# Patient Record
Sex: MALE | Race: WHITE | NOT HISPANIC OR LATINO | URBAN - METROPOLITAN AREA
[De-identification: names, ages, dates, MRNs, and addresses within clinical notes are randomized per-mention and may not be internally consistent; named-entity substitution may affect disease eponyms.]

---

## 2017-07-31 ENCOUNTER — EMERGENCY (EMERGENCY)
Facility: HOSPITAL | Age: 22
LOS: 1 days | Discharge: HOME | End: 2017-07-31
Attending: EMERGENCY MEDICINE | Admitting: EMERGENCY MEDICINE
Payer: COMMERCIAL

## 2017-07-31 VITALS
SYSTOLIC BLOOD PRESSURE: 133 MMHG | OXYGEN SATURATION: 100 % | RESPIRATION RATE: 17 BRPM | DIASTOLIC BLOOD PRESSURE: 75 MMHG | HEART RATE: 61 BPM | TEMPERATURE: 98 F

## 2017-07-31 VITALS
DIASTOLIC BLOOD PRESSURE: 80 MMHG | SYSTOLIC BLOOD PRESSURE: 146 MMHG | HEART RATE: 55 BPM | TEMPERATURE: 99 F | WEIGHT: 131.4 LBS | RESPIRATION RATE: 19 BRPM | OXYGEN SATURATION: 99 %

## 2017-07-31 PROCEDURE — 99284 EMERGENCY DEPT VISIT MOD MDM: CPT | Mod: 25

## 2017-07-31 PROCEDURE — 96372 THER/PROPH/DIAG INJ SC/IM: CPT

## 2017-07-31 PROCEDURE — 99284 EMERGENCY DEPT VISIT MOD MDM: CPT

## 2017-07-31 PROCEDURE — 76705 ECHO EXAM OF ABDOMEN: CPT

## 2017-07-31 PROCEDURE — 76705 ECHO EXAM OF ABDOMEN: CPT | Mod: 26

## 2017-07-31 RX ORDER — IBUPROFEN 200 MG
600 TABLET ORAL ONCE
Qty: 0 | Refills: 0 | Status: COMPLETED | OUTPATIENT
Start: 2017-07-31 | End: 2017-07-31

## 2017-07-31 RX ORDER — ONDANSETRON 8 MG/1
4 TABLET, FILM COATED ORAL ONCE
Qty: 0 | Refills: 0 | Status: DISCONTINUED | OUTPATIENT
Start: 2017-07-31 | End: 2017-07-31

## 2017-07-31 RX ORDER — CEFTRIAXONE 500 MG/1
250 INJECTION, POWDER, FOR SOLUTION INTRAMUSCULAR; INTRAVENOUS ONCE
Qty: 0 | Refills: 0 | Status: COMPLETED | OUTPATIENT
Start: 2017-07-31 | End: 2017-07-31

## 2017-07-31 RX ORDER — AZITHROMYCIN 500 MG/1
1000 TABLET, FILM COATED ORAL ONCE
Qty: 0 | Refills: 0 | Status: COMPLETED | OUTPATIENT
Start: 2017-07-31 | End: 2017-07-31

## 2017-07-31 RX ORDER — ACETAMINOPHEN 500 MG
1000 TABLET ORAL ONCE
Qty: 0 | Refills: 0 | Status: DISCONTINUED | OUTPATIENT
Start: 2017-07-31 | End: 2017-07-31

## 2017-07-31 RX ADMIN — AZITHROMYCIN 1000 MILLIGRAM(S): 500 TABLET, FILM COATED ORAL at 10:18

## 2017-07-31 RX ADMIN — CEFTRIAXONE 250 MILLIGRAM(S): 500 INJECTION, POWDER, FOR SOLUTION INTRAMUSCULAR; INTRAVENOUS at 10:18

## 2017-07-31 RX ADMIN — Medication 600 MILLIGRAM(S): at 10:18

## 2017-07-31 NOTE — ED PROVIDER NOTE - OBJECTIVE STATEMENT
Rolanda Nickerson, DO: 22M with hx of anxiety and testicular torsion s/p b/l orchiopexy 2014 here for L testicular pain 5/10 with associated erythema & swelling that started 1 hour pta. No fevers/chills, +nausea w/o vomiting. Denies abdominal pain/diarrhea. Pt sexually active with monogamous partner without dysuria, using protection.     Uro: Ermelinda. Rolanda Krishan, DO: 22M with hx of anxiety and testicular torsion s/p b/l orchiopexy 2014 here for L testicular pain 5/10 with associated erythema & swelling that started 1 hour pta. No fevers/chills, +nausea w/o vomiting. Denies abdominal pain/diarrhea. Pt sexually active with monogamous partner without dysuria, using protection.     Urologist: Vannesa    Uro: Ermelinda.

## 2017-07-31 NOTE — ED PROVIDER NOTE - NS ED ROS FT
ROS: denies HA, weakness, dizziness, chest pain, SOB, diaphoresis, abdominal pain, diarrhea, joint pain, neuro deficits, dysuria/hematuria, rash.

## 2017-07-31 NOTE — ED ADULT NURSE NOTE - OBJECTIVE STATEMENT
21 yo male with PMH testicular torsion s/p bilateral orchiplexy presents to ED with one hr left testicular swelling, pain, erythema. No fevers/chills, +nausea w/o vomiting. Denies abdominal pain/diarrhea. Pt sexually active with monogamous partner without dysuria, using protection.

## 2017-07-31 NOTE — CONSULT NOTE ADULT - ASSESSMENT
21 yo with intermittent left scrotal pain  especially after ejacualtion    Likely  brisk cremasteric activity.  Reassured patient  Epididymitis also possible    Will d/c on nsaid, moist heat +/- atb      f/u in office 1-2 weeks

## 2017-07-31 NOTE — ED PROVIDER NOTE - PHYSICAL EXAMINATION
Rolanda Nickerson, DO:  Gen: Well appearing, NAD  Head: NCAT  HEENT: PERRL, MMM, normal conjunctiva, anicteric, neck supple  Lung: CTAB, no adventitious sounds  CV: RRR, no murmurs, rubs or gallops  Abd: soft, NTND, no rebound or guarding, no CVAT  : L testicle horizontal lying, no significant erythema or edema, no cremasteric reflex b/l, epididymal TTP not relieved with lifting, no inguinal hernias  MSK: No edema, no visible deformities  Neuro: No focal neurologic deficits. CN II-XII grossly intact. 5/5 strength and normal sensation in all extremities.  Skin: Warm and dry, no evidence of rash  Psych: normal mood and affect Rolanda Nickerson, DO:  Gen: Well appearing, NAD  Head: NCAT  HEENT: PERRL, MMM, normal conjunctiva, anicteric, neck supple  Lung: CTAB, no adventitious sounds  CV: RRR, no murmurs, rubs or gallops  Abd: soft, NTND, no rebound or guarding, no CVAT  : L testicle horizontal lying, no significant erythema or edema, hyperreflexive cremasteric reflex b/l, epididymal TTP not relieved with lifting, no inguinal hernias  MSK: No edema, no visible deformities  Neuro: No focal neurologic deficits. CN II-XII grossly intact. 5/5 strength and normal sensation in all extremities.  Skin: Warm and dry, no evidence of rash  Psych: normal mood and affect

## 2017-07-31 NOTE — ED PROVIDER NOTE - MEDICAL DECISION MAKING DETAILS
****ATTENDING**** 23yo male hx testicular torsion s/p orchiopexy 2014 pw L testicular pain x 1 hour. States patient has intermittent every week that is at times self limiting and at times he comes in to the ER for evaluation. States he is very upset with these intermittent symptoms and has seen urologist wo any intervention. + sexually active, no recent trauma. + masturbation last night. On exam, Patient is awake,alert,oriented x 3. Patient is well appearing and in no acute distress. Patient's chest is clear to ausculation, +s1s2. Abdomen is soft nd/nt +BS. : no lesions, Mild LAD R groin. No cremasteric reflex elicited B/L. + L epididymal tenderness. No swelling or crepitus. No hernia palpated. Extremity with no swelling or calf tenderness.   Check UA, Sono. Pain control and re eval. ****ATTENDING**** 21yo male hx testicular torsion s/p orchiopexy 2014 pw L testicular pain x 1 hour. States patient has intermittent every week that is at times self limiting and at times he comes in to the ER for evaluation. States he is very upset with these intermittent symptoms and has seen urologist wo any intervention. + sexually active, no recent trauma. + masturbation last night. On exam, Patient is awake,alert,oriented x 3. Patient is well appearing and in no acute distress. Patient's chest is clear to ausculation, +s1s2. Abdomen is soft nd/nt +BS. : no lesions, Mild LAD R groin. hyperreflexive cremasteric reflex elicited B/L. + L epididymal tenderness. No swelling or crepitus. No hernia palpated. Extremity with no swelling or calf tenderness.   Check UA, Sono. Pain control and re eval.

## 2017-07-31 NOTE — ED PROVIDER NOTE - PROGRESS NOTE DETAILS
Rolanda Nickerson, DO: s/p evaluation by uro & Rolanda Nickerson, DO: s/p evaluation by urologist & US team. No evidence of torsion on ultrasound study. Clinical evidence suggestive of epididymitis on Ultrasound & physical exam. D/w patient at bedside with Dr. Cabello present. Pt frustrated because he does not think that this is epididymitis. Does not want to give want to give urine today, but is amenable to being empirically treated. Pt to follow up with urologist as discussed. Clinically stable for discharge. Rolanda Nickerson, DO: s/p evaluation by urologist & US team. No evidence of torsion on ultrasound study. Clinical evidence suggestive of epididymitis on Ultrasound & physical exam. D/w patient at bedside with Dr. Cabello present. Pt frustrated because he does not think that this is epididymitis. Does not want to give want to give urine today, but is amenable to being empirically treated. Pt to follow up with urologist as discussed. Clinically stable for discharge.  Agree with above, had a lengthy discussion with patient and asked what we can do to make him feel better, states his urologist said there is no torsion and now he wants to leave. Pt to follow up and return for any concerns. RG

## 2017-07-31 NOTE — CONSULT NOTE ADULT - SUBJECTIVE AND OBJECTIVE BOX
Patient is a 22y old  Male who presents with a chief complaint of left testicular pain.  Occurs after sexual activity.  Feels it pull up and pain occurs.  He is s/p bilatreral orchiiopexy few years ago for intermittent torsion.  Nonabsorbable suture was used and tunica left open.  Denies voding issues.  Feels that it occurs frequently.  ?Swelling at the time.    HPI:      PAST MEDICAL & SURGICAL HISTORY:  Insomnia  Panic attacks  Anxiety  No significant past surgical history      REVIEW OF SYSTEMS:    CONSTITUTIONAL: No weakness, fevers or chills  HEENT: No visual changes;  No vertigo or throat pain   ENDO: No sweating, no palpitations  NECK: No pain or stiffness  MUSCULOSKELETAL: No back pain, no joint pain  RESPIRATORY: No cough, wheezing, hemoptysis; No shortness of breath  CARDIOVASCULAR: No chest pain or palpitations  GASTROINTESTINAL: No abdominal or epigastric pain. No nausea, vomiting, or hematemesis; No diarrhea or constipation. No melena or hematochezia.  NEUROLOGICAL: No numbness or weakness  PSYCH: No depression, no mood changes  SKIN: No itching, burning, rashes, or lesions   All other review of systems is negative unless indicated above.    MEDICATIONS  (STANDING):    MEDICATIONS  (PRN):      Allergies    Levaquin (Rash)  penicillin (Rash)    Intolerances        SOCIAL HISTORY: No illicit drug use    FAMILY HISTORY:      Vital Signs Last 24 Hrs  T(C): 36.4 (31 Jul 2017 10:23), Max: 37.2 (31 Jul 2017 08:45)  T(F): 97.5 (31 Jul 2017 10:23), Max: 99 (31 Jul 2017 08:45)  HR: 61 (31 Jul 2017 10:23) (55 - 61)  BP: 133/75 (31 Jul 2017 10:23) (133/75 - 146/80)  BP(mean): --  RR: 17 (31 Jul 2017 10:23) (17 - 19)  SpO2: 100% (31 Jul 2017 10:23) (99% - 100%)    PHYSICAL EXAM:    Constitutional: NAD, well-developed  HEENT: SHELIA, EOMI, Normal Hearing, MMM  Neck: No JVD  Back: No CVA tenderness  Respiratory: No accessory respiratory muscle use  Abd: Soft, NT/ND  : nl male bialteral-sl. tenderness left epididymis without swelling.  Brisk bilateral cremsteric reflex    Extremities: No calf tenderness  Neurological: A/O x 3, no focal deficits  Psychiatric: Normal mood, normal affect  Skin: No rashes    I&O's Summary      LABS:              Urine Culture:     RADIOLOGY & ADDITIONAL STUDIES:doppler scrotal u/s-no evidence of torsion or tumor

## 2017-07-31 NOTE — ED PROVIDER NOTE - ATTENDING CONTRIBUTION TO CARE
****ATTENDING**** 21yo male hx testicular torsion s/p orchiopexy 2014 pw L testicular pain x 1 hour. States patient has intermittent every week that is at times self limiting and at times he comes in to the ER for evaluation. States he is very upset with these intermittent symptoms and has seen urologist wo any intervention. + sexually active, no recent trauma. + masturbation last night. On exam, Patient is awake,alert,oriented x 3. Patient is well appearing and in no acute distress. Patient's chest is clear to ausculation, +s1s2. Abdomen is soft nd/nt +BS. : no lesions, Mild LAD R groin. No cremasteric reflex elicited B/L. + L epididymal tenderness. No swelling or crepitus. No hernia palpated. Extremity with no swelling or calf tenderness.   Check UA, Sono. Pain control and re eval.

## 2017-10-20 ENCOUNTER — APPOINTMENT (OUTPATIENT)
Dept: UROLOGY | Facility: CLINIC | Age: 22
End: 2017-10-20
Payer: COMMERCIAL

## 2017-10-20 VITALS
RESPIRATION RATE: 16 BRPM | WEIGHT: 135 LBS | HEIGHT: 72 IN | HEART RATE: 66 BPM | SYSTOLIC BLOOD PRESSURE: 129 MMHG | TEMPERATURE: 97.7 F | BODY MASS INDEX: 18.28 KG/M2 | DIASTOLIC BLOOD PRESSURE: 72 MMHG

## 2017-10-20 DIAGNOSIS — Z80.3 FAMILY HISTORY OF MALIGNANT NEOPLASM OF BREAST: ICD-10-CM

## 2017-10-20 DIAGNOSIS — F41.9 ANXIETY DISORDER, UNSPECIFIED: ICD-10-CM

## 2017-10-20 DIAGNOSIS — Z80.0 FAMILY HISTORY OF MALIGNANT NEOPLASM OF DIGESTIVE ORGANS: ICD-10-CM

## 2017-10-20 DIAGNOSIS — Z86.59 PERSONAL HISTORY OF OTHER MENTAL AND BEHAVIORAL DISORDERS: ICD-10-CM

## 2017-10-20 PROCEDURE — 99203 OFFICE O/P NEW LOW 30 MIN: CPT

## 2017-10-20 RX ORDER — CLONAZEPAM 2 MG/1
TABLET ORAL
Refills: 0 | Status: ACTIVE | COMMUNITY

## 2017-11-17 ENCOUNTER — APPOINTMENT (OUTPATIENT)
Dept: UROLOGY | Facility: CLINIC | Age: 22
End: 2017-11-17
Payer: COMMERCIAL

## 2017-11-17 PROCEDURE — 99215 OFFICE O/P EST HI 40 MIN: CPT

## 2017-11-19 ENCOUNTER — MOBILE ON CALL (OUTPATIENT)
Age: 22
End: 2017-11-19

## 2017-11-22 ENCOUNTER — APPOINTMENT (OUTPATIENT)
Dept: UROLOGY | Facility: CLINIC | Age: 22
End: 2017-11-22
Payer: COMMERCIAL

## 2017-11-22 VITALS
TEMPERATURE: 97.9 F | SYSTOLIC BLOOD PRESSURE: 99 MMHG | HEART RATE: 64 BPM | DIASTOLIC BLOOD PRESSURE: 62 MMHG | OXYGEN SATURATION: 99 %

## 2017-11-22 PROCEDURE — 99214 OFFICE O/P EST MOD 30 MIN: CPT

## 2017-11-30 ENCOUNTER — MESSAGE (OUTPATIENT)
Age: 22
End: 2017-11-30

## 2018-03-01 ENCOUNTER — TRANSCRIPTION ENCOUNTER (OUTPATIENT)
Age: 23
End: 2018-03-01

## 2018-03-01 ENCOUNTER — APPOINTMENT (OUTPATIENT)
Dept: UROLOGY | Facility: CLINIC | Age: 23
End: 2018-03-01
Payer: COMMERCIAL

## 2018-03-01 DIAGNOSIS — N50.819 TESTICULAR PAIN, UNSPECIFIED: ICD-10-CM

## 2018-03-01 PROCEDURE — 99214 OFFICE O/P EST MOD 30 MIN: CPT

## 2018-03-01 RX ORDER — AMITRIPTYLINE HYDROCHLORIDE 10 MG/1
10 TABLET, FILM COATED ORAL
Qty: 30 | Refills: 10 | Status: DISCONTINUED | COMMUNITY
Start: 2017-10-20 | End: 2018-03-01

## 2018-03-01 RX ORDER — BUPROPION HYDROCHLORIDE 150 MG/1
150 TABLET, EXTENDED RELEASE ORAL
Qty: 30 | Refills: 0 | Status: DISCONTINUED | COMMUNITY
Start: 2017-10-09

## 2018-03-01 RX ORDER — GABAPENTIN 300 MG/1
300 CAPSULE ORAL
Qty: 60 | Refills: 0 | Status: ACTIVE | COMMUNITY
Start: 2018-02-26

## 2018-03-01 RX ORDER — ESCITALOPRAM OXALATE 10 MG/1
10 TABLET ORAL
Qty: 30 | Refills: 0 | Status: DISCONTINUED | COMMUNITY
Start: 2017-10-09

## 2018-03-01 RX ORDER — NAPROXEN 500 MG/1
500 TABLET ORAL
Qty: 21 | Refills: 0 | Status: ACTIVE | COMMUNITY
Start: 2017-08-30

## 2018-11-26 ENCOUNTER — EMERGENCY (EMERGENCY)
Facility: HOSPITAL | Age: 23
LOS: 1 days | Discharge: HOME | End: 2018-11-26
Attending: EMERGENCY MEDICINE | Admitting: EMERGENCY MEDICINE
Payer: COMMERCIAL

## 2018-11-26 VITALS
OXYGEN SATURATION: 100 % | HEART RATE: 50 BPM | DIASTOLIC BLOOD PRESSURE: 79 MMHG | RESPIRATION RATE: 16 BRPM | TEMPERATURE: 98 F | SYSTOLIC BLOOD PRESSURE: 125 MMHG

## 2018-11-26 VITALS
RESPIRATION RATE: 18 BRPM | TEMPERATURE: 98 F | DIASTOLIC BLOOD PRESSURE: 68 MMHG | OXYGEN SATURATION: 97 % | SYSTOLIC BLOOD PRESSURE: 109 MMHG | HEART RATE: 86 BPM

## 2018-11-26 LAB
APPEARANCE UR: CLEAR — SIGNIFICANT CHANGE UP
BILIRUB UR-MCNC: NEGATIVE — SIGNIFICANT CHANGE UP
COLOR SPEC: YELLOW — SIGNIFICANT CHANGE UP
DIFF PNL FLD: ABNORMAL
GLUCOSE UR QL: NEGATIVE — SIGNIFICANT CHANGE UP
KETONES UR-MCNC: NEGATIVE — SIGNIFICANT CHANGE UP
LEUKOCYTE ESTERASE UR-ACNC: NEGATIVE — SIGNIFICANT CHANGE UP
NITRITE UR-MCNC: NEGATIVE — SIGNIFICANT CHANGE UP
PH UR: 6 — SIGNIFICANT CHANGE UP (ref 5–8)
PROT UR-MCNC: NEGATIVE MG/DL — SIGNIFICANT CHANGE UP
SP GR SPEC: >=1.03 — SIGNIFICANT CHANGE UP (ref 1–1.03)
UROBILINOGEN FLD QL: 0.2 E.U./DL — SIGNIFICANT CHANGE UP

## 2018-11-26 PROCEDURE — 99284 EMERGENCY DEPT VISIT MOD MDM: CPT | Mod: 25

## 2018-11-26 PROCEDURE — 76870 US EXAM SCROTUM: CPT | Mod: 26

## 2018-11-26 PROCEDURE — 87086 URINE CULTURE/COLONY COUNT: CPT

## 2018-11-26 PROCEDURE — 81001 URINALYSIS AUTO W/SCOPE: CPT

## 2018-11-26 PROCEDURE — 76870 US EXAM SCROTUM: CPT

## 2018-11-26 PROCEDURE — 87591 N.GONORRHOEAE DNA AMP PROB: CPT

## 2018-11-26 PROCEDURE — 87491 CHLMYD TRACH DNA AMP PROBE: CPT

## 2018-11-26 PROCEDURE — 99284 EMERGENCY DEPT VISIT MOD MDM: CPT

## 2018-11-26 RX ORDER — IBUPROFEN 200 MG
600 TABLET ORAL ONCE
Qty: 0 | Refills: 0 | Status: COMPLETED | OUTPATIENT
Start: 2018-11-26 | End: 2018-11-26

## 2018-11-26 RX ADMIN — Medication 600 MILLIGRAM(S): at 08:27

## 2018-11-26 RX ADMIN — Medication 600 MILLIGRAM(S): at 09:09

## 2018-11-26 NOTE — ED ADULT NURSE NOTE - OBJECTIVE STATEMENT
Pt is a 23y male presented to ED w/ c/o testicular pain. Pt states has long history w/ similar issue, testicular torsion w/ surgery. Pt describes pain to me 6/7 out of 10. Denies any other medical history, denies fever/chills, no N/V/D, no chest pain/ SOB. Allergic to penicillin and Levaquin. No distress noted, medicated for pain as per MD orders. Pending ultrasound.

## 2018-11-26 NOTE — ED PROVIDER NOTE - PHYSICAL EXAMINATION
CONSTITUTIONAL: Well-appearing; well-nourished; in no apparent distress.   HEAD: Normocephalic; atraumatic.   EYES: PERRL; EOM intact; conjunctiva and sclera clear   NECK: Supple; non-tender; no LAD  CARDIOVASCULAR: Normal S1, S2; no murmurs, rubs, or gallops. Regular rate and rhythm.   RESPIRATORY: Breathing easily; breath sounds clear and equal bilaterally; no wheezes, rhonchi, or rales.  GI: Soft; non-distended; non-tender; no palpable organomegaly.   : no testicular swelling, erythema or warmth, + mild L testicular tenderness   MSK: FROM at all extremities, normal tone   EXT: No cyanosis or edema; N/V intact  SKIN: Normal for age and race; warm; dry; good turgor; no apparent lesions or rash.

## 2018-11-26 NOTE — ED ADULT TRIAGE NOTE - CHIEF COMPLAINT QUOTE
Pt states "I have left testicular pain. I have a hx of torsion with multiple surgeries in the past. I also had a spermatic cord block 2 weeks ago. This is a chronic issue. The pain and swelling is here now so my MD told me whenever this happens to go to the ER for an U/S."

## 2018-11-26 NOTE — ED PROVIDER NOTE - ATTENDING CONTRIBUTION TO CARE
24 yo m with PMH of intermittent testicular torsion s/p b/l orchiopexy 2014 with chronic pain c/o L testicular pain and swelling since 12am. Pt has chronic testicular pain and is currently seeing a specialist and is on gabapentin. Has been treated with abx fir these sxs multiple times. Denies fever, chills, penile discharge, dysuria, hematuria. Pt AAO, NAD,  exam per PA exam. Sono unremarkable. Pt to f/up outpt.

## 2018-11-26 NOTE — ED PROVIDER NOTE - MEDICAL DECISION MAKING DETAILS
24 yo m with pmh of intermittent testicular torsion s/o b/l orchiopexy 2014 with chronic pain c/o L testicular pain and swelling since 12am. No f/c, discharge. No hx of STDs. 1 partner, no protection. no testicular swelling, erythema or warmth, + mild L testicular tenderness 22 yo m with pmh of intermittent testicular torsion s/o b/l orchiopexy 2014 with chronic pain c/o L testicular pain and swelling since 12am. No f/c, discharge. No hx of STDs. 1 partner, no protection. no testicular swelling, erythema or warmth, + mild L testicular tenderness. sono unremarkable

## 2018-11-26 NOTE — ED PROVIDER NOTE - OBJECTIVE STATEMENT
24 yo m with pmh of intermittent testicular torsion s/o b/l orchiopexy 2014 with chronic pain c/o L testicular pain and swelling since 12am. Pt reports pain and swelling is chronic and he is currently seeing a specialist. Pt has been on gabapentin in the past and has been treated with abx multiple times. Pt had a spermatic cord block 2 weeks ago. Denies fever, chills, penile discharge, dysuria, hematuria. Pt is sexually active with 1 partner, does not use protection. No hx of STDs.

## 2018-11-26 NOTE — ED ADULT NURSE NOTE - NSIMPLEMENTINTERV_GEN_ALL_ED
Implemented All Universal Safety Interventions:  [place] to call system. Call bell, personal items and telephone within reach. Instruct patient to call for assistance. Room bathroom lighting operational. Non-slip footwear when patient is off stretcher. Physically safe environment: no spills, clutter or unnecessary equipment. Stretcher in lowest position, wheels locked, appropriate side rails in place.

## 2018-11-27 LAB
C TRACH RRNA SPEC QL NAA+PROBE: SIGNIFICANT CHANGE UP
CULTURE RESULTS: NO GROWTH — SIGNIFICANT CHANGE UP
N GONORRHOEA RRNA SPEC QL NAA+PROBE: SIGNIFICANT CHANGE UP
SPECIMEN SOURCE: SIGNIFICANT CHANGE UP
SPECIMEN SOURCE: SIGNIFICANT CHANGE UP

## 2018-11-30 DIAGNOSIS — Z79.899 OTHER LONG TERM (CURRENT) DRUG THERAPY: ICD-10-CM

## 2018-11-30 DIAGNOSIS — N50.89 OTHER SPECIFIED DISORDERS OF THE MALE GENITAL ORGANS: ICD-10-CM

## 2018-11-30 DIAGNOSIS — N50.812 LEFT TESTICULAR PAIN: ICD-10-CM

## 2018-11-30 DIAGNOSIS — Z88.1 ALLERGY STATUS TO OTHER ANTIBIOTIC AGENTS STATUS: ICD-10-CM

## 2018-11-30 DIAGNOSIS — Z88.0 ALLERGY STATUS TO PENICILLIN: ICD-10-CM

## 2019-01-10 NOTE — ED ADULT TRIAGE NOTE - CCCP TRG CHIEF CMPLNT
Patient is having back pain, pressure, and burning when urinating. Patient is losing his insurance tonight. She wants to know if he need any medications or any additional tests? I notified her that we will not have the results of the culture back for a few days (he dropped a new urine off today) but they are concerned of what to do because of insurance. His last culture was not sent off.    testicular pain

## 2019-03-26 ENCOUNTER — OTHER (OUTPATIENT)
Age: 24
End: 2019-03-26

## 2019-05-17 NOTE — H&P
Patient with left sided testicular pain which has been chronic.  For varicococelctomy denervation of cord.  I agree with note. No change in H and P.

## 2019-05-20 ENCOUNTER — ANESTHESIA EVENT (OUTPATIENT)
Dept: OPERATING ROOM | Facility: HOSPITAL | Age: 24
Setting detail: HOSPITAL OUTPATIENT SURGERY
End: 2019-05-20
Payer: COMMERCIAL

## 2019-05-20 ENCOUNTER — HOSPITAL ENCOUNTER (OUTPATIENT)
Facility: HOSPITAL | Age: 24
Setting detail: HOSPITAL OUTPATIENT SURGERY
Discharge: HOME | End: 2019-05-20
Attending: UROLOGY | Admitting: UROLOGY
Payer: COMMERCIAL

## 2019-05-20 VITALS
RESPIRATION RATE: 16 BRPM | BODY MASS INDEX: 19.77 KG/M2 | TEMPERATURE: 98.2 F | WEIGHT: 146 LBS | HEART RATE: 66 BPM | OXYGEN SATURATION: 100 % | DIASTOLIC BLOOD PRESSURE: 60 MMHG | HEIGHT: 72 IN | SYSTOLIC BLOOD PRESSURE: 124 MMHG

## 2019-05-20 PROCEDURE — 25000000 HC PHARMACY GENERAL: Performed by: UROLOGY

## 2019-05-20 PROCEDURE — 63600000 HC DRUGS/DETAIL CODE: Performed by: UROLOGY

## 2019-05-20 PROCEDURE — 71000011 HC PACU PHASE 1 EA ADDL MIN: Performed by: UROLOGY

## 2019-05-20 PROCEDURE — 63600000 HC DRUGS/DETAIL CODE: Mod: JW | Performed by: NURSE ANESTHETIST, CERTIFIED REGISTERED

## 2019-05-20 PROCEDURE — 25000000 HC PHARMACY GENERAL: Performed by: NURSE ANESTHETIST, CERTIFIED REGISTERED

## 2019-05-20 PROCEDURE — 71000012 HC PACU PHASE 2 EA ADDL MIN: Performed by: UROLOGY

## 2019-05-20 PROCEDURE — 71000001 HC PACU PHASE 1 INITIAL 30MIN: Performed by: UROLOGY

## 2019-05-20 PROCEDURE — 27200000 HC STERILE SUPPLY: Performed by: UROLOGY

## 2019-05-20 PROCEDURE — 63600000 HC DRUGS/DETAIL CODE: Performed by: NURSE ANESTHETIST, CERTIFIED REGISTERED

## 2019-05-20 PROCEDURE — 36000013 HC OR LEVEL 3 EA ADDL MIN: Performed by: UROLOGY

## 2019-05-20 PROCEDURE — 63700000 HC SELF-ADMINISTRABLE DRUG

## 2019-05-20 PROCEDURE — 71000002 HC PACU PHASE 2 INITIAL 30MIN: Performed by: UROLOGY

## 2019-05-20 PROCEDURE — 36000003 HC OR LEVEL 3 INITIAL 30MIN: Performed by: UROLOGY

## 2019-05-20 PROCEDURE — 63700000 HC SELF-ADMINISTRABLE DRUG: Performed by: UROLOGY

## 2019-05-20 PROCEDURE — 0VBG0ZZ EXCISION OF LEFT SPERMATIC CORD, OPEN APPROACH: ICD-10-PCS | Performed by: UROLOGY

## 2019-05-20 PROCEDURE — 37000001 HC ANESTHESIA GENERAL: Performed by: UROLOGY

## 2019-05-20 RX ORDER — NEOSTIGMINE METHYLSULFATE 1 MG/ML
INJECTION INTRAVENOUS AS NEEDED
Status: DISCONTINUED | OUTPATIENT
Start: 2019-05-20 | End: 2019-05-20 | Stop reason: SURG

## 2019-05-20 RX ORDER — DEXAMETHASONE SODIUM PHOSPHATE 4 MG/ML
INJECTION, SOLUTION INTRA-ARTICULAR; INTRALESIONAL; INTRAMUSCULAR; INTRAVENOUS; SOFT TISSUE AS NEEDED
Status: DISCONTINUED | OUTPATIENT
Start: 2019-05-20 | End: 2019-05-20 | Stop reason: SURG

## 2019-05-20 RX ORDER — IBUPROFEN 200 MG
16-32 TABLET ORAL AS NEEDED
Status: CANCELLED | OUTPATIENT
Start: 2019-05-20 | End: 2019-08-18

## 2019-05-20 RX ORDER — ACETAMINOPHEN 325 MG/1
325 TABLET ORAL EVERY 4 HOURS PRN
Status: DISCONTINUED | OUTPATIENT
Start: 2019-05-20 | End: 2019-05-20 | Stop reason: HOSPADM

## 2019-05-20 RX ORDER — SODIUM CHLORIDE 9 MG/ML
INJECTION, SOLUTION INTRAVENOUS CONTINUOUS
Status: DISCONTINUED | OUTPATIENT
Start: 2019-05-20 | End: 2019-05-20 | Stop reason: HOSPADM

## 2019-05-20 RX ORDER — OXYCODONE AND ACETAMINOPHEN 5; 325 MG/1; MG/1
TABLET ORAL
Status: DISCONTINUED
Start: 2019-05-20 | End: 2019-05-20 | Stop reason: HOSPADM

## 2019-05-20 RX ORDER — LORAZEPAM 1 MG/1
TABLET ORAL
Status: DISCONTINUED
Start: 2019-05-20 | End: 2019-05-20 | Stop reason: HOSPADM

## 2019-05-20 RX ORDER — CLONAZEPAM 1 MG/1
1 TABLET ORAL 3 TIMES DAILY
Status: CANCELLED | OUTPATIENT
Start: 2019-05-20

## 2019-05-20 RX ORDER — MIDAZOLAM HYDROCHLORIDE 2 MG/2ML
INJECTION, SOLUTION INTRAMUSCULAR; INTRAVENOUS AS NEEDED
Status: DISCONTINUED | OUTPATIENT
Start: 2019-05-20 | End: 2019-05-20 | Stop reason: SURG

## 2019-05-20 RX ORDER — CLINDAMYCIN PHOSPHATE 600 MG/50ML
INJECTION, SOLUTION INTRAVENOUS
Status: COMPLETED
Start: 2019-05-20 | End: 2019-05-20

## 2019-05-20 RX ORDER — GLYCOPYRROLATE 0.6MG/3ML
SYRINGE (ML) INTRAVENOUS AS NEEDED
Status: DISCONTINUED | OUTPATIENT
Start: 2019-05-20 | End: 2019-05-20 | Stop reason: SURG

## 2019-05-20 RX ORDER — ACETAMINOPHEN 325 MG/1
TABLET ORAL
Status: DISCONTINUED
Start: 2019-05-20 | End: 2019-05-20 | Stop reason: HOSPADM

## 2019-05-20 RX ORDER — DEXTROSE 40 %
15-30 GEL (GRAM) ORAL AS NEEDED
Status: CANCELLED | OUTPATIENT
Start: 2019-05-20 | End: 2019-08-18

## 2019-05-20 RX ORDER — HYDROMORPHONE HYDROCHLORIDE 1 MG/ML
INJECTION, SOLUTION INTRAMUSCULAR; INTRAVENOUS; SUBCUTANEOUS AS NEEDED
Status: DISCONTINUED | OUTPATIENT
Start: 2019-05-20 | End: 2019-05-20 | Stop reason: SURG

## 2019-05-20 RX ORDER — OXYCODONE AND ACETAMINOPHEN 5; 325 MG/1; MG/1
2 TABLET ORAL EVERY 4 HOURS PRN
Status: DISCONTINUED | OUTPATIENT
Start: 2019-05-20 | End: 2019-05-20 | Stop reason: HOSPADM

## 2019-05-20 RX ORDER — ONDANSETRON HYDROCHLORIDE 2 MG/ML
INJECTION, SOLUTION INTRAVENOUS AS NEEDED
Status: DISCONTINUED | OUTPATIENT
Start: 2019-05-20 | End: 2019-05-20 | Stop reason: SURG

## 2019-05-20 RX ORDER — BUPIVACAINE HYDROCHLORIDE 2.5 MG/ML
INJECTION, SOLUTION EPIDURAL; INFILTRATION; INTRACAUDAL AS NEEDED
Status: DISCONTINUED | OUTPATIENT
Start: 2019-05-20 | End: 2019-05-20 | Stop reason: HOSPADM

## 2019-05-20 RX ORDER — PROPOFOL 10 MG/ML
INJECTION, EMULSION INTRAVENOUS AS NEEDED
Status: DISCONTINUED | OUTPATIENT
Start: 2019-05-20 | End: 2019-05-20 | Stop reason: SURG

## 2019-05-20 RX ORDER — LORAZEPAM 0.5 MG/1
1 TABLET ORAL ONCE
Status: COMPLETED | OUTPATIENT
Start: 2019-05-20 | End: 2019-05-20

## 2019-05-20 RX ORDER — LIDOCAINE HYDROCHLORIDE 10 MG/ML
INJECTION, SOLUTION INFILTRATION; PERINEURAL AS NEEDED
Status: DISCONTINUED | OUTPATIENT
Start: 2019-05-20 | End: 2019-05-20 | Stop reason: SURG

## 2019-05-20 RX ORDER — DEXTROSE 50 % IN WATER (D50W) INTRAVENOUS SYRINGE
25 AS NEEDED
Status: CANCELLED | OUTPATIENT
Start: 2019-05-20 | End: 2019-08-18

## 2019-05-20 RX ORDER — DEXAMETHASONE SODIUM PHOSPHATE 4 MG/ML
INJECTION, SOLUTION INTRA-ARTICULAR; INTRALESIONAL; INTRAMUSCULAR; INTRAVENOUS; SOFT TISSUE AS NEEDED
Status: DISCONTINUED | OUTPATIENT
Start: 2019-05-20 | End: 2019-05-20 | Stop reason: HOSPADM

## 2019-05-20 RX ORDER — OXYCODONE AND ACETAMINOPHEN 5; 325 MG/1; MG/1
TABLET ORAL
Status: COMPLETED
Start: 2019-05-20 | End: 2019-05-20

## 2019-05-20 RX ORDER — ROCURONIUM BROMIDE 10 MG/ML
INJECTION, SOLUTION INTRAVENOUS AS NEEDED
Status: DISCONTINUED | OUTPATIENT
Start: 2019-05-20 | End: 2019-05-20 | Stop reason: SURG

## 2019-05-20 RX ORDER — CLINDAMYCIN PHOSPHATE 600 MG/50ML
600 INJECTION, SOLUTION INTRAVENOUS ONCE
Status: COMPLETED | OUTPATIENT
Start: 2019-05-20 | End: 2019-05-20

## 2019-05-20 RX ADMIN — CLINDAMYCIN IN 5 PERCENT DEXTROSE 600 MG: 12 INJECTION, SOLUTION INTRAVENOUS at 07:44

## 2019-05-20 RX ADMIN — LORAZEPAM 1 MG: 1 TABLET ORAL at 13:58

## 2019-05-20 RX ADMIN — NEOSTIGMINE METHYLSULFATE 5 MG: 1 INJECTION INTRAVENOUS at 10:00

## 2019-05-20 RX ADMIN — DEXAMETHASONE SODIUM PHOSPHATE 4 MG: 4 INJECTION, SOLUTION INTRAMUSCULAR; INTRAVENOUS at 08:09

## 2019-05-20 RX ADMIN — Medication 0.2 MG: at 07:26

## 2019-05-20 RX ADMIN — OXYCODONE HYDROCHLORIDE AND ACETAMINOPHEN 1 TABLET: 5; 325 TABLET ORAL at 11:35

## 2019-05-20 RX ADMIN — OXYCODONE HYDROCHLORIDE AND ACETAMINOPHEN 1 TABLET: 5; 325 TABLET ORAL at 12:25

## 2019-05-20 RX ADMIN — ACETAMINOPHEN 325 MG: 325 TABLET, FILM COATED ORAL at 11:35

## 2019-05-20 RX ADMIN — HYDROMORPHONE HYDROCHLORIDE 1 MG: 1 INJECTION, SOLUTION INTRAMUSCULAR; INTRAVENOUS; SUBCUTANEOUS at 07:56

## 2019-05-20 RX ADMIN — PROPOFOL 200 MG: 10 INJECTION, EMULSION INTRAVENOUS at 07:37

## 2019-05-20 RX ADMIN — LIDOCAINE HYDROCHLORIDE 5 ML: 10 INJECTION, SOLUTION INFILTRATION; PERINEURAL at 07:37

## 2019-05-20 RX ADMIN — ONDANSETRON 4 MG: 2 INJECTION INTRAMUSCULAR; INTRAVENOUS at 10:00

## 2019-05-20 RX ADMIN — ROCURONIUM BROMIDE 50 MG: 10 INJECTION, SOLUTION INTRAVENOUS at 07:37

## 2019-05-20 RX ADMIN — MIDAZOLAM HYDROCHLORIDE 2 MG: 1 INJECTION, SOLUTION INTRAMUSCULAR; INTRAVENOUS at 07:25

## 2019-05-20 RX ADMIN — ROCURONIUM BROMIDE 30 MG: 10 INJECTION, SOLUTION INTRAVENOUS at 08:45

## 2019-05-20 RX ADMIN — Medication 0.8 MG: at 10:00

## 2019-05-20 ASSESSMENT — PAIN - FUNCTIONAL ASSESSMENT
PAIN_FUNCTIONAL_ASSESSMENT: 0-10
PAIN_FUNCTIONAL_ASSESSMENT: NO/DENIES PAIN

## 2019-05-20 ASSESSMENT — ENCOUNTER SYMPTOMS: DEPRESSION: 1

## 2019-05-20 NOTE — ANESTHESIA PREPROCEDURE EVALUATION
Anesthesia ROS/MED HX      Neuro/Psych    Depression   Anxiety  ROS/MED HX Comments:    Neurology/Psychology: H/o shingles      Past Surgical History:   Procedure Laterality Date   • ORCHIOPEXY Bilateral        Physical Exam    Airway   Mallampati: II   TM distance: >3 FB   Neck ROM: full  Cardiovascular - normal   Rhythm: regular   Rate: normal  Pulmonary - normal   clear to auscultation        Anesthesia Plan    Plan: general    Technique: general endotracheal   ASA 1  Anesthetic plan and risks discussed with: mother, father and patient

## 2019-05-20 NOTE — BRIEF OP NOTE
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    Specimens:                No specimens collected during this procedure.      Drains:      Implants: * No implants in log *           Complications:  None; patient tolerated the procedure well.           Disposition: PACU - hemodynamically stable.           Condition: stable    Aditya Lara MD  Phone Number: [phone]

## 2019-05-20 NOTE — ANESTHESIA PROCEDURE NOTES
Airway  Urgency: elective    Start Time: 5/20/2019 7:38 AM  Airway not difficult    General Information and Staff    Patient location during procedure: OR  Resident/CRNA: LB BAKER  Performed: resident/CRNA     Indications and Patient Condition  Indications for airway management: anesthesia  Sedation level: deep  Preoxygenated: yes  Patient position: sniffing  MILS maintained throughout  Mask difficulty assessment: 1 - vent by mask    Final Airway Details  Final airway type: endotracheal airway      Successful airway: ETT  Cuffed: yes   Successful intubation technique: direct laryngoscopy  Facilitating devices/methods: intubating stylet  Endotracheal tube insertion site: oral  Blade: Pino  Blade size: #3  ETT size: 8.0 mm  Cormack-Lehane Classification: grade I - full view of glottis  Placement verified by: chest auscultation and capnometry   Measured from: teeth  ETT to teeth (cm): 21  Number of attempts at approach: 1  Ventilation between attempts: none  Number of other approaches attempted: 0  Atraumatic airway insertion

## 2019-05-20 NOTE — DISCHARGE INSTRUCTIONS
DISCHARGE INSTRUCTIONS   WOUND CARE:  • Dressings or bandages may be removed 2 days after surgery, if stirry strips they will fall off.     ACTIVITY:  • You may walk and climb stairs without limitations.  • You may not do any vigorous physical activity for 2 weeks (biking, running, swimming, gym, golf, tennis, etc.).    • Sexual activity may be resumed 2 weeks after healing and when the swelling and discomfort permit.  • Hold off no physical therapy for 2 weeks.     SHOWERING:  • You may shower two days after you return home from the hospital must be padded dry immediately after showering  • Tub bath are not recommended as this will soak your incision and increase risk of infection  Lifting   • No lifting greater than 10 pounds for two weeks     DRIVING:  • You should not drive while taking narcotic pain killers    MEDICATIONS:  • After surgery you should immediately resume your regular medications.  o If you regularly take aspirin, you may resume it in 1 week.   o You will receive a narcotic pain medication    PAIN:  • You can expect to have some pain that may require pain medications a few days after discharge, after which Motrin should be sufficient to control your pain    DIET AND BOWELS:  • You may eat whatever you like  • Narcotic pain pills can cause constipation.  If needed, you may use over the counter oral laxatives (such as miralax) to help your bowels get started.      WHEN TO CALL US:  • Temperature of 101.0 degrees or above (fevers <101 may be normal after surgery).  • Severe pain - not relieved by narcotic pain medication, especially if it is associated with nausea, vomiting, or dizziness  • Heavy bleeding from, worsening redness or separation of your incisions.  • Any concerns    APPOINTMENTS:  • Two week postoperatively for wound check in the office.  • Interval appointments as needed for problems or concerns.      You will be on Percocet  take 1-2 every 4-6 hours do not take Tylenol if taking  Percocet  May take motrin starting tomorrow    Call Dr. Lara if problems of any concern  [phone]  Folllow up in 2 weeks      Aditya Lara MD

## 2019-05-20 NOTE — ANESTHESIA POSTPROCEDURE EVALUATION
Patient: Brock Gilliland    Procedure Summary     Date:  05/20/19 Room / Location:  [facility] OR [address] [place] [address] OR PAV    Anesthesia Start:  0729 Anesthesia Stop:  1028    Procedure:  VARICOCELECTOMY Microscopic (Left ) Diagnosis:       Scrotal varices      (Scrotal Varies)    Surgeon:  Aditya Lara MD Responsible Provider:  Fahad Martinez MD    Anesthesia Type:  general ASA Status:  1          Anesthesia Type: general  PACU Vitals  5/20/2019 1023 - 5/20/2019 1123      5/20/2019 1030 5/20/2019 1031 5/20/2019 1035 5/20/2019 1040    BP: 120/73 120/73 - (!)  125/59    Temp: - 37.2 °C (98.9 °F) - -    Pulse: 71 63 63 (!)  57    Resp: 16 16 19 (!)  24    SpO2: 98 % 98 % 98 % 97 %              5/20/2019 1045 5/20/2019 1050 5/20/2019 1100 5/20/2019 1110    BP: - (!)  119/57 (!)  108/57 114/65    Temp: - - - 37 °C (98.6 °F)    Pulse: (!)  52 (!)  52 (!)  51 (!)  49    Resp: (!)  46 (!)  30 (!)  35 (!)  34    SpO2: 95 % 95 % 96 % 96 %      PACU Vitals  5/20/2019 1021 - 5/20/2019 1121      5/20/2019 1030 5/20/2019 1031 5/20/2019 1035 5/20/2019 1040    BP: 120/73 120/73 - (!)  125/59    Temp: - 37.2 °C (98.9 °F) - -    Pulse: 71 63 63 (!)  57    Resp: 16 16 19 (!)  24    SpO2: 98 % 98 % 98 % 97 %              5/20/2019 1045 5/20/2019 1050 5/20/2019 1100 5/20/2019 1110    BP: - (!)  119/57 (!)  108/57 114/65    Temp: - - - 37 °C (98.6 °F)    Pulse: (!)  52 (!)  52 (!)  51 (!)  49    Resp: (!)  46 (!)  30 (!)  35 (!)  34    SpO2: 95 % 95 % 96 % 96 %            Anesthesia Post Evaluation    Pain management: satisfactory to patient  Mode of pain management: IV medication  Patient location during evaluation: PACU  Patient participation: complete - patient participated  Level of consciousness: awake and alert  Cardiovascular status: acceptable  Airway Patency: adequate  Respiratory status: acceptable  Hydration status: stable  Anesthetic complications: no

## 2019-05-20 NOTE — PERIOPERATIVE NURSING NOTE
Pt unable to void after multiple attempts. Dr. Lara informed and asks pt to walk aroumnd and sit to void, after 1.5 hours pt still unable to void, bladder scanner says 519  Cc. Dr. Lara gives order for po ativan and to straight cath. Pt steraight cathed and 800 ml urine produced. Pt tolerated procedure w no complaints.

## 2019-05-20 NOTE — OP NOTE
Pre op diagnosis: Bilateral varicocele  Post op diagnosis: Same  Procedure: Bilateral microscopic varicocelectomy  Surgeon: Dr. Aditya Lara   Assistant: RFNA  Complications: None  Preoperative dx left testicular pain , left varicolectomy   Post operatve dx same  Procedure Left microscopic varicocelectomy , nerve denervation   Surgeon Aditya mobley  EBL minimal  IV fluids 1 leter  Finding: left varicoceles  3 measuring 3.5 mm in diameter   Status: Stable     Patient is here for a left  microscopic varicocelectomy and nerve denervation for chronic Left testicular pain.  Prior to the procedure I discussed all the risks and benefits of the procedure.  Including bleeding  infection hydrocele inability to increase pain , no resolution of the ain, shrinkage of the testicle damage to the testicle resulting in removal.    Prior to the case the patient was given IV Clindomycin 600mg (he was allergic to Penicilin)  .  He was then transferred back to the operating. He then transferred himself to the operating room table.  Next general anesthesia was administered.  Next he was prepped and draped in sterile fashion.  A left phong- left inguinal incision was made this was dissected down past Hernandez's fascia the left spermatic cord was then identified and brought into the field. The area of the gentiofemral superfical nerves were ligated.   We then directed our attention towards the left spermatic cord again a Doppler was performed to identify the artery.   The internal and external fascia was removed using the combination of bipolar cautery and sharp incision.  The veins were identified the artery was once again identified with 3-0 silk tie we tied and ligated approximately 7 veins (3 measuring over 3.5 mm) on the left the artery was preserved.  The left vas deferns was isolated the the superficial area was evaluated.   All hemostasis was achieved we then placed the spermatic cord in its anatomical position by tugging on the left  testicle.  The Hernandez's fascia was  closed  with a 3-0 chromic and for the  and then with a 4-0 Monocryl for the skin was closed. The  patient was then extubated and transferred to the PACU in stable condition

## 2019-07-10 ENCOUNTER — EMERGENCY (EMERGENCY)
Facility: HOSPITAL | Age: 24
LOS: 1 days | Discharge: HOME | End: 2019-07-10
Attending: EMERGENCY MEDICINE | Admitting: EMERGENCY MEDICINE
Payer: COMMERCIAL

## 2019-07-10 VITALS
RESPIRATION RATE: 16 BRPM | SYSTOLIC BLOOD PRESSURE: 143 MMHG | HEART RATE: 51 BPM | DIASTOLIC BLOOD PRESSURE: 84 MMHG | OXYGEN SATURATION: 99 % | TEMPERATURE: 98 F

## 2019-07-10 VITALS
HEIGHT: 72 IN | WEIGHT: 149.91 LBS | DIASTOLIC BLOOD PRESSURE: 86 MMHG | RESPIRATION RATE: 18 BRPM | SYSTOLIC BLOOD PRESSURE: 178 MMHG | HEART RATE: 60 BPM | TEMPERATURE: 98 F | OXYGEN SATURATION: 97 %

## 2019-07-10 DIAGNOSIS — Z98.890 OTHER SPECIFIED POSTPROCEDURAL STATES: Chronic | ICD-10-CM

## 2019-07-10 DIAGNOSIS — N50.812 LEFT TESTICULAR PAIN: ICD-10-CM

## 2019-07-10 LAB
ALBUMIN SERPL ELPH-MCNC: 5.1 G/DL — HIGH (ref 3.3–5)
ALP SERPL-CCNC: SIGNIFICANT CHANGE UP U/L (ref 40–120)
ALT FLD-CCNC: SIGNIFICANT CHANGE UP U/L (ref 10–45)
ANION GAP SERPL CALC-SCNC: 13 MMOL/L — SIGNIFICANT CHANGE UP (ref 5–17)
APPEARANCE UR: CLEAR — SIGNIFICANT CHANGE UP
APTT BLD: 21.1 SEC — LOW (ref 27.5–36.3)
AST SERPL-CCNC: SIGNIFICANT CHANGE UP U/L (ref 10–40)
BASOPHILS # BLD AUTO: 0.06 K/UL — SIGNIFICANT CHANGE UP (ref 0–0.2)
BASOPHILS NFR BLD AUTO: 0.7 % — SIGNIFICANT CHANGE UP (ref 0–2)
BILIRUB SERPL-MCNC: 0.6 MG/DL — SIGNIFICANT CHANGE UP (ref 0.2–1.2)
BILIRUB UR-MCNC: NEGATIVE — SIGNIFICANT CHANGE UP
BLD GP AB SCN SERPL QL: NEGATIVE — SIGNIFICANT CHANGE UP
BUN SERPL-MCNC: 24 MG/DL — HIGH (ref 7–23)
CALCIUM SERPL-MCNC: 9.6 MG/DL — SIGNIFICANT CHANGE UP (ref 8.4–10.5)
CHLORIDE SERPL-SCNC: 97 MMOL/L — SIGNIFICANT CHANGE UP (ref 96–108)
CO2 SERPL-SCNC: 26 MMOL/L — SIGNIFICANT CHANGE UP (ref 22–31)
COLOR SPEC: YELLOW — SIGNIFICANT CHANGE UP
CREAT SERPL-MCNC: 0.94 MG/DL — SIGNIFICANT CHANGE UP (ref 0.5–1.3)
DIFF PNL FLD: NEGATIVE — SIGNIFICANT CHANGE UP
EOSINOPHIL # BLD AUTO: 0.06 K/UL — SIGNIFICANT CHANGE UP (ref 0–0.5)
EOSINOPHIL NFR BLD AUTO: 0.7 % — SIGNIFICANT CHANGE UP (ref 0–6)
GLUCOSE SERPL-MCNC: 99 MG/DL — SIGNIFICANT CHANGE UP (ref 70–99)
GLUCOSE UR QL: NEGATIVE — SIGNIFICANT CHANGE UP
HCT VFR BLD CALC: 48.1 % — SIGNIFICANT CHANGE UP (ref 39–50)
HGB BLD-MCNC: 16.8 G/DL — SIGNIFICANT CHANGE UP (ref 13–17)
IMM GRANULOCYTES NFR BLD AUTO: 0.2 % — SIGNIFICANT CHANGE UP (ref 0–1.5)
INR BLD: 1.03 — SIGNIFICANT CHANGE UP (ref 0.88–1.16)
KETONES UR-MCNC: NEGATIVE — SIGNIFICANT CHANGE UP
LEUKOCYTE ESTERASE UR-ACNC: NEGATIVE — SIGNIFICANT CHANGE UP
LYMPHOCYTES # BLD AUTO: 4.18 K/UL — HIGH (ref 1–3.3)
LYMPHOCYTES # BLD AUTO: 47.5 % — HIGH (ref 13–44)
MCHC RBC-ENTMCNC: 32.9 PG — SIGNIFICANT CHANGE UP (ref 27–34)
MCHC RBC-ENTMCNC: 34.9 GM/DL — SIGNIFICANT CHANGE UP (ref 32–36)
MCV RBC AUTO: 94.1 FL — SIGNIFICANT CHANGE UP (ref 80–100)
MONOCYTES # BLD AUTO: 0.81 K/UL — SIGNIFICANT CHANGE UP (ref 0–0.9)
MONOCYTES NFR BLD AUTO: 9.2 % — SIGNIFICANT CHANGE UP (ref 2–14)
NEUTROPHILS # BLD AUTO: 3.67 K/UL — SIGNIFICANT CHANGE UP (ref 1.8–7.4)
NEUTROPHILS NFR BLD AUTO: 41.7 % — LOW (ref 43–77)
NITRITE UR-MCNC: NEGATIVE — SIGNIFICANT CHANGE UP
NRBC # BLD: 0 /100 WBCS — SIGNIFICANT CHANGE UP (ref 0–0)
PH UR: 6 — SIGNIFICANT CHANGE UP (ref 5–8)
PLATELET # BLD AUTO: 169 K/UL — SIGNIFICANT CHANGE UP (ref 150–400)
POTASSIUM SERPL-MCNC: SIGNIFICANT CHANGE UP MMOL/L (ref 3.5–5.3)
POTASSIUM SERPL-SCNC: SIGNIFICANT CHANGE UP MMOL/L (ref 3.5–5.3)
PROT SERPL-MCNC: 8.1 G/DL — SIGNIFICANT CHANGE UP (ref 6–8.3)
PROT UR-MCNC: NEGATIVE MG/DL — SIGNIFICANT CHANGE UP
PROTHROM AB SERPL-ACNC: 11.7 SEC — SIGNIFICANT CHANGE UP (ref 10–12.9)
RBC # BLD: 5.11 M/UL — SIGNIFICANT CHANGE UP (ref 4.2–5.8)
RBC # FLD: 11.3 % — SIGNIFICANT CHANGE UP (ref 10.3–14.5)
RH IG SCN BLD-IMP: POSITIVE — SIGNIFICANT CHANGE UP
RH IG SCN BLD-IMP: POSITIVE — SIGNIFICANT CHANGE UP
SODIUM SERPL-SCNC: 136 MMOL/L — SIGNIFICANT CHANGE UP (ref 135–145)
SP GR SPEC: 1.01 — SIGNIFICANT CHANGE UP (ref 1–1.03)
UROBILINOGEN FLD QL: 0.2 E.U./DL — SIGNIFICANT CHANGE UP
WBC # BLD: 8.8 K/UL — SIGNIFICANT CHANGE UP (ref 3.8–10.5)
WBC # FLD AUTO: 8.8 K/UL — SIGNIFICANT CHANGE UP (ref 3.8–10.5)

## 2019-07-10 PROCEDURE — 81003 URINALYSIS AUTO W/O SCOPE: CPT

## 2019-07-10 PROCEDURE — 86850 RBC ANTIBODY SCREEN: CPT

## 2019-07-10 PROCEDURE — 87086 URINE CULTURE/COLONY COUNT: CPT

## 2019-07-10 PROCEDURE — 76870 US EXAM SCROTUM: CPT | Mod: 26

## 2019-07-10 PROCEDURE — 96374 THER/PROPH/DIAG INJ IV PUSH: CPT

## 2019-07-10 PROCEDURE — 86901 BLOOD TYPING SEROLOGIC RH(D): CPT

## 2019-07-10 PROCEDURE — 80053 COMPREHEN METABOLIC PANEL: CPT

## 2019-07-10 PROCEDURE — 36415 COLL VENOUS BLD VENIPUNCTURE: CPT

## 2019-07-10 PROCEDURE — 99284 EMERGENCY DEPT VISIT MOD MDM: CPT

## 2019-07-10 PROCEDURE — 85730 THROMBOPLASTIN TIME PARTIAL: CPT

## 2019-07-10 PROCEDURE — 85610 PROTHROMBIN TIME: CPT

## 2019-07-10 PROCEDURE — 99284 EMERGENCY DEPT VISIT MOD MDM: CPT | Mod: 25

## 2019-07-10 PROCEDURE — 86900 BLOOD TYPING SEROLOGIC ABO: CPT

## 2019-07-10 PROCEDURE — 85025 COMPLETE CBC W/AUTO DIFF WBC: CPT

## 2019-07-10 PROCEDURE — 76870 US EXAM SCROTUM: CPT

## 2019-07-10 RX ORDER — SODIUM CHLORIDE 9 MG/ML
1000 INJECTION INTRAMUSCULAR; INTRAVENOUS; SUBCUTANEOUS ONCE
Refills: 0 | Status: COMPLETED | OUTPATIENT
Start: 2019-07-10 | End: 2019-07-10

## 2019-07-10 RX ORDER — KETOROLAC TROMETHAMINE 30 MG/ML
30 SYRINGE (ML) INJECTION ONCE
Refills: 0 | Status: DISCONTINUED | OUTPATIENT
Start: 2019-07-10 | End: 2019-07-10

## 2019-07-10 RX ADMIN — SODIUM CHLORIDE 1000 MILLILITER(S): 9 INJECTION INTRAMUSCULAR; INTRAVENOUS; SUBCUTANEOUS at 02:20

## 2019-07-10 RX ADMIN — Medication 30 MILLIGRAM(S): at 02:20

## 2019-07-10 RX ADMIN — Medication 30 MILLIGRAM(S): at 02:31

## 2019-07-10 NOTE — ED PROVIDER NOTE - NSFOLLOWUPCLINICS_GEN_ALL_ED_FT
[facility]  Urology  [address], [address] [place] [address]  Phone: [phone]  Fax:   Follow Up Time: 4-6 Days

## 2019-07-10 NOTE — ED ADULT NURSE NOTE - OBJECTIVE STATEMENT
Patient came in to ED c/o L testicular pain. patient with hx of BL orchioprexy, and recent sx to L testicles. Per patient he has hx of testicular torsion in the past that necessitated him to have BL orchioprexy. Denies recent sexual activity, last sexual activity Saturday per patient. No trauma or other precipitating factors. L testicular tenderness, mild swelling noted.

## 2019-07-10 NOTE — ED PROVIDER NOTE - GENITOURINARY, MLM
left testicle with tenderness to palpation with ?minimal swellingNo penile d left testicle with tenderness to palpation with ?minimal swelling- pt states that it was much more swollen PTA.  No penile discharge, no rashes. old Surgical scar in left groin noted.

## 2019-07-10 NOTE — ED PROVIDER NOTE - NSFOLLOWUPINSTRUCTIONS_ED_ALL_ED_FT
Please follow up with your Urologist in a few days. Take over the counter Ibuprofen as prescribed for pain.    Testicle Pain    WHAT YOU NEED TO KNOW:    Testicle pain may start in your scrotum and spread to your abdomen. You may have sharp, sudden pain or dull pain that happens over time. Your testicle pain may come and go, or it may last for a long time. The cause of your pain may be unknown. Testicle pain can be caused by infection, trauma, hernia, kidney stones, or sexually transmitted infections (STIs). You may have a painful lump in your scrotum. The lump may be caused by an enlarged vein or fluid that collects around one of your testicles. This lump also may be caused by a more serious medical condition. Part of your testicle may twist. This is a serious condition that needs treatment as soon as possible.    DISCHARGE INSTRUCTIONS:    Medicines:     Antibiotics: This medicine helps fight or prevent infection. Take your antibiotics until they are gone, even if you feel better.      Pain medicine: You may be given a prescription medicine to decrease pain. Do not wait until the pain is severe before you take this medicine.      NSAIDs: These medicines decrease swelling, pain, and fever. NSAIDs are available without a doctor's order. Ask your healthcare provider which medicine is right for you. Ask how much to take and when to take it. Take as directed. NSAIDs can cause stomach bleeding and kidney problems if not taken correctly.      Take your medicine as directed. Contact your healthcare provider if you think your medicine is not helping or if you have side effects. Tell him or her if you are allergic to any medicine. Keep a list of the medicines, vitamins, and herbs you take. Include the amounts, and when and why you take them. Bring the list or the pill bottles to follow-up visits. Carry your medicine list with you in case of an emergency.    Decrease discomfort: With treatment, your pain may improve within 1 to 3 days. Depending on the cause of your testicle pain, your condition may take up to 4 weeks to heal.     Rest: Limit your activity until your pain decreases. Get more rest while you heal. Do not sit for long periods of time.       Cold packs: Place cold packs on your testicles to help ease your pain. Use cold packs as directed.      Elevation: Gently tuck a folded towel under your testicles to lift them as you sit in a chair or lie in bed. This will help ease your pain and decrease swelling.    Follow up with your healthcare provider or urologist in 3 to 7 days: Write down your questions so you remember to ask them during your visits.    Sexual activity: Avoid sexual activity until you have finished your antibiotics or until your healthcare provider tells you it is safe to have sex. Use condoms to lower your risk of STIs.    Contact your healthcare provider or urologist if:     You feel that your medicine or treatment is not working.      You feel more pain, tenderness, or swelling than before.      You have nausea or a low fever.      You have questions or concerns about your condition or care.    Return to the emergency department if:     You have sudden or severe pain in your testicles or abdomen.      You have pain in both testicles.      You are vomiting.      You have a high fever.      Your pain increases when you elevate your testicles.      Your scrotum turns blue. This could mean your testicle is not getting the blood flow it needs.

## 2019-07-10 NOTE — ED PROVIDER NOTE - CLINICAL SUMMARY MEDICAL DECISION MAKING FREE TEXT BOX
23 yo M with PMH/PSH of b/l orchiopexy 5 years ago secondary to frequent intermittent left sided testicular torsion and then subsequent spermatic cord denervation a couple of months ago in [place] secondary to chronic left testicular pain p/w sudden onset severe left testicular pain and swelling that started twenty minutes prior to arrival while he was laying down in bed. Was not sexually active or active in any way prior to onset of sxs. No abd pain, N,V, flank pain, fevers, chills, dysuria or gross hematuria. No other complaints. No change in urination over the past few hours. 23 yo M with PMH/PSH of b/l orchiopexy 5 years ago secondary to frequent intermittent left sided testicular torsion and then subsequent spermatic cord denervation a couple of months ago in [place] secondary to chronic left testicular pain p/w sudden onset severe left testicular pain and swelling that started twenty minutes prior to arrival while he was laying down in bed. Was not sexually active or active in any way prior to onset of sxs. No abd pain, N,V, flank pain, fevers, chills, dysuria or gross hematuria. No other complaints. No change in urination over the past few hours. Scrotal u/s done with no signs of torsion. Urology consulted and in ED to see pt. Pt given pain meds and pain-free in ED. Requesting dc. Per Urology pt may f/up outpt with his Urologist. Pt states that he will see him today. Return precautions given.

## 2019-07-10 NOTE — ED ADULT NURSE REASSESSMENT NOTE - NS ED NURSE REASSESS COMMENT FT1
Ambulated to bathroom, and urinated clear yellow urine. Urology at bedside evaluating patient, dispo pending.

## 2019-07-10 NOTE — CONSULT NOTE ADULT - SUBJECTIVE AND OBJECTIVE BOX
CONSULT NOTE:     Patient is a 24y old  Male who presents with a chief complaint of  left testicular pain.    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 Patient reports the pain as sharp and was sudden in onset. He states he was in bed about to go to sleep when the pain occurred. Patient reports seeing multiple urologists after his b/l orchiopexy for his continued left sided testicular pain, always being told "nothing is wrong". He states this pain is similar to the episodes he had prior to the denervation surgery in May, but this is the first episode of pain since that procedure. Patient states the pain has subsided after receiving toradol in ER.  Denies sexual activity or trauma prior to pain onset. Denies dysuria, hematuria, flank pain, back pain, pain at tip of penis, urgency, frequency, redness.       Vital Signs Last 24 Hrs  T(C): 36.6 (10 Jul 2019 01:19), Max: 36.6 (10 Jul 2019 01:19)  T(F): 97.8 (10 Jul 2019 01:19), Max: 97.8 (10 Jul 2019 01:19)  HR: 60 (10 Jul 2019 01:19) (60 - 60)  BP: 178/86 (10 Jul 2019 01:19) (178/86 - 178/86)  BP(mean): --  RR: 18 (10 Jul 2019 01:19) (18 - 18)  SpO2: 97% (10 Jul 2019 01:19) (97% - 97%)  I&O's Summary      PE:  Gen: NAD  Abd: soft, ntnd, No CVAT b/l  : b/l descended testicles, left lying slightly higher than right. no visibly swelling, edema or erythema, nontender scrotum/testicles/epididymis b/l. circumsized.     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  CONSULT NOTE:     Patient is a 24y old  Male who presents with a chief complaint of  left testicular pain.    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 Patient reports the pain as sharp and was sudden in onset. He states he was in bed about to go to sleep when the pain occurred. Patient reports seeing multiple urologists after his b/l orchiopexy for his continued left sided testicular pain, always being told "nothing is wrong". He states this pain is similar to the episodes he had prior to the denervation surgery in May, but this is the first episode of pain since that procedure. Patient states the pain has subsided after receiving toradol in ER.  Denies sexual activity or trauma prior to pain onset. Denies dysuria, hematuria, flank pain, back pain, pain at tip of penis, urgency, frequency, redness.       Vital Signs Last 24 Hrs  T(C): 36.6 (10 Jul 2019 01:19), Max: 36.6 (10 Jul 2019 01:19)  T(F): 97.8 (10 Jul 2019 01:19), Max: 97.8 (10 Jul 2019 01:19)  HR: 60 (10 Jul 2019 01:19) (60 - 60)  BP: 178/86 (10 Jul 2019 01:19) (178/86 - 178/86)  BP(mean): --  RR: 18 (10 Jul 2019 01:19) (18 - 18)  SpO2: 97% (10 Jul 2019 01:19) (97% - 97%)  I&O's Summary      PE:  Gen: NAD  Abd: soft, ntnd, No CVAT b/l  : b/l descended testicles, left lying slightly higher than right. no visibly swelling, edema or erythema, nontender scrotum/testicles/epididymis b/l. circumsized.     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 Scrotal US negative for torsion, negative epididymitis. Patient is in minimal pain/discomfort after toradol administration.  -NSAIDS  -f/u with his own urologist or [facility] on Friday  -Recommended to see a pain management doctor  -d/w  team/ Dr. Pena/ Dr. Heaton  -

## 2019-07-10 NOTE — ED PROVIDER NOTE - OBJECTIVE STATEMENT
25 yo M with hx of b/l orchiopexy 5 years ago secondary to frequent intermittent left sided testicular torsion and then subsequent spermatic cord denervation a couple of months ago in [place] secondary to chronic left testicular pain p/w sudden onset severe left testicular pain and swelling that started twenty minutes prior to arrival while he was laying down in bed. Was not sexually active or active in any way prior to onset of sxs. No abd pain, N,V, flank pain, fevers, chills, dysuria or grosss 23 yo M with PMH/PSH of b/l orchiopexy 5 years ago secondary to frequent intermittent left sided testicular torsion and then subsequent spermatic cord denervation a couple of months ago in [place] secondary to chronic left testicular pain p/w sudden onset severe left testicular pain and swelling that started twenty minutes prior to arrival while he was laying down in bed. Was not sexually active or active in any way prior to onset of sxs. No abd pain, N,V, flank pain, fevers, chills, dysuria or gross hematuria. No other complaints.  No change in urination over the past few hours. 23 yo M with PMH/PSH of b/l orchiopexy 5 years ago secondary to frequent intermittent left sided testicular torsion and then subsequent spermatic cord denervation a couple of months ago in [place] secondary to chronic left testicular pain p/w sudden onset severe left testicular pain and swelling that started twenty minutes prior to arrival while he was laying down in bed. Was not sexually active or active in any way prior to onset of sxs. No abd pain, N,V, flank pain, fevers, chills, dysuria or gross hematuria. No other complaints. No change in urination over the past few hours. 23 yo M with PMH/PSH of b/l orchiopexy 5 years ago secondary to frequent intermittent left sided testicular torsion and then subsequent left sided spermatic cord denervation a couple of months ago in [place] secondary to chronic left testicular pain p/w sudden onset severe left testicular pain and swelling that started twenty minutes prior to arrival while he was laying down in bed. Was not sexually active or active in any way prior to onset of sxs. No abd pain, N,V, flank pain, fevers, chills, dysuria or gross hematuria. No other complaints. No change in urination over the past few hours.

## 2019-07-10 NOTE — ED ADULT NURSE NOTE - CHPI ED NUR SYMPTOMS NEG
no hematuria/no chills/no dysuria/no blood in stool/no fever/no nausea/no diarrhea/no abdominal distension

## 2019-07-11 LAB
CULTURE RESULTS: NO GROWTH — SIGNIFICANT CHANGE UP
SPECIMEN SOURCE: SIGNIFICANT CHANGE UP

## 2020-01-31 ENCOUNTER — TRANSCRIPTION ENCOUNTER (OUTPATIENT)
Age: 25
End: 2020-01-31

## 2020-07-05 ENCOUNTER — TRANSCRIPTION ENCOUNTER (OUTPATIENT)
Age: 25
End: 2020-07-05

## 2020-07-23 ENCOUNTER — TRANSCRIPTION ENCOUNTER (OUTPATIENT)
Age: 25
End: 2020-07-23

## 2020-10-05 ENCOUNTER — TRANSCRIPTION ENCOUNTER (OUTPATIENT)
Age: 25
End: 2020-10-05

## 2020-11-05 ENCOUNTER — TRANSCRIPTION ENCOUNTER (OUTPATIENT)
Age: 25
End: 2020-11-05

## 2020-11-23 ENCOUNTER — TRANSCRIPTION ENCOUNTER (OUTPATIENT)
Age: 25
End: 2020-11-23

## 2021-09-22 NOTE — ED ADULT NURSE NOTE - URINE COLOR
August 26, 2021        Patient Name: Viktor Mesa  Surgeon Name: Surgeon(s):  Kay Rolle M.D.  Brenton Polanco P.A.-C.  Surgery Facility: [facility] ([address] [place] [address])  Surgery Date: 9/22/2021    The time of your surgery is not final and may change up to and until the day of your surgery. You will be contacted 24-48 hours prior to your surgery date with your check-in and surgery time.    BEFORE YOUR SURGERY    Pre Registration and/or Lab Work/Tests must be done within 14 to 28 days before to your surgery date. Please call [facility] at [phone] option 2, then option 1, for an appointment as soon as possible.    Pre op Appointment:   Date: 9.8.21   Time: 2:00pm   Provider: Brenton Polanco PA-C   Location: [address] [place] [address]    Bring a list of all medications you are taking including the dosing and frequency.    Continue taking all lifesaving medications. Including the morning of your surgery with small sip of water.    Please read the MEDICATION INSTRUCTIONS below completely.    DAY OF YOUR SURGERY    Nothing to eat or drink and refrain from smoking any substance after midnight the day of your surgery. Smoking may interfere with the anesthetic and frequently produces nausea during the recovery period.    Please arrive at the hospital/surgery center at the check-in time provided.     You will need a responsible adult to drive you to and from your surgery.        AFTER YOUR SURGERY    Post op Appointment:   Date: 10.6.21   Time: 2:00pm   Provider: Brenton Polanco PA-C   Location: [address] [place] [address]    TIME OFF WORK    FMLA or Disability forms can be faxed directly to [phone] or you may drop them off at any [facility]. Our office charges a $35.00 fee. Forms will be completed within 5-10 business days after payment is received. For the status of your forms you may contact our disability office directly at [phone].    MEDICATION INSTRUCTIONS: Do not take  these medications prior to your procedure:  • Anti-inflammatories: stop 7 days prior, restart when advised. For fusions avoid for 12 weeks after surgery  o Naproxen (Naprosyn or Alleve)  o Motrin  o Ibuprofen  o Nabumetone (Relafen)  o Meloxicam (Mobic)  o Celebrex  o Salsalate  o Diclofenac (Arthrotec, Voltaren, Flector)  o Sulindac (Clinoril  o Etodolac (Lodine)  o Indomethacin (Indocin)  o Ketoprofen  o Ketorolac  o Oxaprozin (Daypro)  o Piroxicam (Feldene  o Blood thinners (stop after approval from the prescribing physician)  • Aspirin (any dosage): 10 days prior, restart 7 days after procedure  o Any medications that contain aspirin in combination (ie: Excedrin migraine, Fiorinal, and Norgesic)  • Warfarin (Coumadin): Stop 7 days prior, INR day of procedure, restart 2-3 weeks after procedure  • Antiplatelet: restart 7 days after procedure  o Ticlid (ticlopidine): Stop 14 days prior  o  Plavix (clopidogrel): Stop 14 days prior  o Aggrenox or Dipyridamole: Stop 14 days prior  o ReoPro (abciximab): Stop 14 days prior  o Integrilin (eptifibatide): Stop 14 days prior  • Aggrastat (tirofiban): Stop 14 days prior  • Lovenox (Enoxaparin): Stop 24hrs before and restart 24hrs after procedure  • Heparin: Stop 24hrs before   • Dalteparin (Fragmin): Stop 24hrs before  • Fondaparinux (Arixtra): Stop 24hrs before  • Xeralto, Dabigatran (Pradaxa) Stop 7 days prior  • Eliquis (apibaxan) Stop 7 days prior  Okay to take these medications as prescribed:  • Muscle relaxers  • Acetaminophen and pain medications that have it in addition to oxycodone and hydrocodone  • Blood pressure, cholesterol and diabetes medications are ok    DENTAL PROCDURES/CLEANINGS: Avoid 3 weeks before surgery and for 3 months after surgery.    If you have any questions, please contact our office.    [facility] [address], [address]  [place] [address] [phone]     yellow

## 2025-07-07 NOTE — ED PROVIDER NOTE - NS ED ATTENDING STATEMENT MOD
4/2/25 2/12/25   I have personally seen and examined this patient.  I have fully participated in the care of this patient. I have reviewed all pertinent clinical information, including history, physical exam, plan and the Resident’s note and agree except as noted.

## (undated) DEVICE — DRAPE STERI TOWEL PLASTIC 18X24

## (undated) DEVICE — PROBE DOPPLER STRAIGH VTI 20 MHZ

## (undated) DEVICE — STERISTRIP 1/4X1.5IN

## (undated) DEVICE — PACK MLHS MINOR PROCEDURE

## (undated) DEVICE — SYRINGE 10CC NO NEEDLE ST

## (undated) DEVICE — TIP BOVIE BLADE COATED INSULATED 2.75IN

## (undated) DEVICE — SYRINGE DISP LUER-LOK 20 CC

## (undated) DEVICE — PENCIL ESU HANDSWITCHING W/HOL

## (undated) DEVICE — HEMOSTAT SURGICEL ABSORBABLE 4 X 8

## (undated) DEVICE — BLADE SCALPEL #11

## (undated) DEVICE — SUTURE MONOCRYL 4-0  Y496G PS-2 I8IN

## (undated) DEVICE — DRAPE OPMI 65MM OBJ W/ ACC (X5) NO. 71

## (undated) DEVICE — PAD GROUND ELECTROSURGICAL W/CORD

## (undated) DEVICE — MASTISOL LIQUID ADHESIVE

## (undated) DEVICE — NEEDLE DISP HYPO 25GX1-1/2IN

## (undated) DEVICE — GLOVE SURG PROTEXIS PF 8

## (undated) DEVICE — DRAIN PENROSE 1/2 X 12 LATEX

## (undated) DEVICE — ***USE 56900*** SUTURE CHROMIC GUT 3-0 G122H

## (undated) DEVICE — ***USE 138888*** SUTURE SILK 3-0 A304H 30IN TIES

## (undated) DEVICE — CATH IV 16G X 1.25IN ACUVANCE PLUS

## (undated) DEVICE — SOLN IRRIG .9%SOD 1000ML

## (undated) DEVICE — COVER LIGHTHANDLE

## (undated) DEVICE — DRESSING AIRSTRIP PRIMAPORE

## (undated) DEVICE — GOWN SURGICAL REINFORCED X-LAR